# Patient Record
Sex: FEMALE | Race: WHITE | Employment: OTHER | URBAN - METROPOLITAN AREA
[De-identification: names, ages, dates, MRNs, and addresses within clinical notes are randomized per-mention and may not be internally consistent; named-entity substitution may affect disease eponyms.]

---

## 2017-01-01 ENCOUNTER — DOCUMENTATION ONLY (OUTPATIENT)
Dept: FAMILY MEDICINE CLINIC | Age: 82
End: 2017-01-01

## 2017-01-01 ENCOUNTER — NURSE NAVIGATOR (OUTPATIENT)
Dept: FAMILY MEDICINE CLINIC | Age: 82
End: 2017-01-01

## 2017-01-06 NOTE — PROGRESS NOTES
1/6/17, Chart Review. Patient kept Cardiology F/U with Dr Delores Lopes, 6/9/16. No further ED/Hospitalizations noted at this time. Will remain available for further NN needs. 1/6/17, [address] [place] [address], [phone], spoke to Frank, able to confirm, patient remains long term at facility at this time and Dr Carolynn Barcenas is current physician that follows her care. See Previous NN Encounter Notes.   S/P Patient S/P 4/24/16 - 5/2/16 at [facility] related to chest pain/NSTEMI, discharged to [facility]

## 2017-02-13 ENCOUNTER — DOCUMENTATION ONLY (OUTPATIENT)
Dept: FAMILY MEDICINE CLINIC | Age: 82
End: 2017-02-13

## 2017-02-13 NOTE — PROGRESS NOTES
[facility] orders were signed and faxed back to [phone]. Confirmed, scanned.

## 2017-02-14 ENCOUNTER — DOCUMENTATION ONLY (OUTPATIENT)
Dept: FAMILY MEDICINE CLINIC | Age: 82
End: 2017-02-14

## 2017-02-14 NOTE — PROGRESS NOTES
[facility] orders were put on [facility]'s desk to process